# Patient Record
Sex: MALE | Race: BLACK OR AFRICAN AMERICAN | NOT HISPANIC OR LATINO | Employment: FULL TIME | URBAN - METROPOLITAN AREA
[De-identification: names, ages, dates, MRNs, and addresses within clinical notes are randomized per-mention and may not be internally consistent; named-entity substitution may affect disease eponyms.]

---

## 2017-06-09 ENCOUNTER — OFFICE VISIT (OUTPATIENT)
Dept: MEDICAL GROUP | Facility: MEDICAL CENTER | Age: 42
End: 2017-06-09
Attending: FAMILY MEDICINE
Payer: MEDICAID

## 2017-06-09 VITALS
WEIGHT: 238 LBS | TEMPERATURE: 98.1 F | HEIGHT: 73 IN | RESPIRATION RATE: 16 BRPM | BODY MASS INDEX: 31.54 KG/M2 | DIASTOLIC BLOOD PRESSURE: 80 MMHG | HEART RATE: 60 BPM | SYSTOLIC BLOOD PRESSURE: 118 MMHG | OXYGEN SATURATION: 100 %

## 2017-06-09 DIAGNOSIS — M25.562 LEFT LATERAL KNEE PAIN: ICD-10-CM

## 2017-06-09 DIAGNOSIS — E66.9 OBESITY (BMI 30-39.9): ICD-10-CM

## 2017-06-09 DIAGNOSIS — F12.90 MARIJUANA USE, CONTINUOUS: ICD-10-CM

## 2017-06-09 DIAGNOSIS — M54.50 BILATERAL LOW BACK PAIN WITHOUT SCIATICA, UNSPECIFIED CHRONICITY: ICD-10-CM

## 2017-06-09 PROCEDURE — 99204 OFFICE O/P NEW MOD 45 MIN: CPT | Performed by: FAMILY MEDICINE

## 2017-06-09 PROCEDURE — 99203 OFFICE O/P NEW LOW 30 MIN: CPT | Performed by: FAMILY MEDICINE

## 2017-06-09 RX ORDER — PREDNISONE 10 MG/1
TABLET ORAL
Qty: 30 TAB | Refills: 0 | Status: SHIPPED | OUTPATIENT
Start: 2017-06-09 | End: 2017-09-20

## 2017-06-09 RX ORDER — CYCLOBENZAPRINE HCL 10 MG
10 TABLET ORAL 3 TIMES DAILY PRN
Qty: 75 TAB | Refills: 0 | Status: SHIPPED | OUTPATIENT
Start: 2017-06-09 | End: 2017-09-20 | Stop reason: SDUPTHER

## 2017-06-09 ASSESSMENT — PATIENT HEALTH QUESTIONNAIRE - PHQ9: CLINICAL INTERPRETATION OF PHQ2 SCORE: 1

## 2017-06-09 ASSESSMENT — PAIN SCALES - GENERAL: PAINLEVEL: 10=SEVERE PAIN

## 2017-06-09 NOTE — PROGRESS NOTES
Chief Complaint   Patient presents with   • Establish Care   • Back Pain   • Knee Pain         HISTORY OF THE PRESENT ILLNESS: Patient is a 41 y.o. male. This pleasant patient is here today to establish care, and be evaluated for left knee pain, low back pain      Left lateral knee pain  Patient notes on and off pains over the years with his left knee. However he reports the past 2 months persistent pain on the lateral aspect of his lower left knee. There is no associated buckling or locking. Reports at times there is a swelling and not will form below the lateral joint line. Patient seems to experience more pain after he's been more active in the sits or lies down. Has not had any prior procedures or imaging    Marijuana use, continuous  Patient reports with increased back and knee pain over the past 2 months he has taken to smoking 7-8 blunts (larger joints) each day. He does not smoke any tobacco. He does not use any illicit substances. He recalls drinking heavily in the past but has markedly restricted his alcohol intake in the past 2 months    Bilateral low back pain without sciatica  Patient notes past short-lived episodes of low back pain. He reports persistent pain over the past month in his lower lumbar bilateral area. Pain does not radiate to his legs. There is no associated urinary or fecal incontinence. Reports gait is normal. Reports back stiffens up making it difficult to get out of bed or up out of a chair area and he has been taking thousand milligrams of ibuprofen 5 or 6 times per day. Denies any epigastric pain or black stools. Notes mild benefit with that amount of ibuprofen.     Social history-single, not working  Allergies: Review of patient's allergies indicates no known allergies.    Current Outpatient Prescriptions Ordered in [facility]   Medication Sig Dispense Refill   • predniSONE (DELTASONE) 10 MG Tab 5 by mouth daily ×2 days, 4 by mouth daily ×2 days, 3 by mouth daily ×2 days, 2 by mouth daily  "×2 days, 1 by mouth daily ×2 days 30 Tab 0   • cyclobenzaprine (FLEXERIL) 10 MG Tab Take 1 Tab by mouth 3 times a day as needed. 75 Tab 0     No current Epic-ordered facility-administered medications on file.       History reviewed. No pertinent past medical history.    History reviewed. No pertinent past surgical history.    Social History   Substance Use Topics   • Smoking status: Never Smoker    • Smokeless tobacco: Never Used   • Alcohol Use: Yes      Comment: sometimes, reduced  since Mar 2017            Family History   Problem Relation Age of Onset   • Cancer Mother    • Diabetes Mother    • Cancer Sister      breast   • Heart Disease Neg Hx    • Stroke Neg Hx        Review of Systems   Constitutional: Negative for fever, chills, weight loss and malaise/fatigue.   HENT: Negative for ear pain, nosebleeds, congestion, sore throat and neck pain.    Eyes: Negative for blurred vision.   Respiratory: Negative for cough, sputum production, shortness of breath and wheezing.    Cardiovascular: Negative for chest pain, palpitations, orthopnea and leg swelling.   Gastrointestinal: Negative for heartburn, nausea, vomiting and abdominal pain.   Genitourinary: Negative for dysuria, urgency and frequency.   Musculoskeletal: Positive for low back pain, lateral left knee pain  Skin: Negative for rash and itching.   Neurological: Negative for dizziness, tingling, tremors, sensory change, focal weakness and headaches.   Endo/Heme/Allergies: Does not bruise/bleed easily.   Psychiatric/Behavioral: Negative for depression, anxiety, or memory loss.            Exam: Blood pressure 118/80, pulse 60, temperature 36.7 °C (98.1 °F), resp. rate 16, height 1.854 m (6' 1\"), weight 107.956 kg (238 lb), SpO2 100 %.  General: Normal appearing. No distress.  HEENT: Normocephalic. Eyes conjunctiva clear lids without ptosis, pupils equal and reactive to light accommodation, ears normal shape and contour, canals are clear bilaterally, tympanic " membranes are benign, nasal mucosa benign, oropharynx is without erythema, edema or exudates.   Neck: Supple without JVD or bruit. Thyroid is not enlarged.  Pulmonary: Clear to ausculation.  Normal effort. No rales, ronchi, or wheezing.  Cardiovascular: Regular rate and rhythm without murmur. Carotid and radial pulses are intact and equal bilaterally.  Abdomen: Soft, nontender, nondistended. Normal bowel sounds. Liver and spleen are not palpable  Neurologic: Intact light touch and strength bilaterally,normal speech, no tremor, normal gait.   Lymph: No cervical, supraclavicular or axillary lymph nodes are palpable  Skin: Warm and dry.  No obvious lesions.  Musculoskeletal: Normal gait. No extremity cyanosis, clubbing, or edema. Left knee notable for slight swelling/thickening with no firm induration or fluctuance near the head of the fibula on the left side. No overlying redness or swelling. Anterior drawer is negative. No mediolateral instability. Patella is nontender and shows normal mobility  Psych: Normal mood and affect. Alert and oriented x3. Judgment and insight is normal.  Back-1+ tender midline from L3-S2 and the paraspinous areas bilaterally at that same level    Please note that this dictation was created using voice recognition software. I have made every reasonable attempt to correct obvious errors, but I expect that there are errors of grammar and possibly content that I did not discover before finalizing the note.      Assessment/Plan  1. Obesity (BMI 30-39.9)     2. Left lateral knee pain  DX-KNEE COMPLETE 4+ LEFT   3. Bilateral low back pain without sciatica, unspecified chronicity  DX-LUMBAR SPINE-4+ VIEWS   4. Marijuana use, continuous       Plan: 1. Lumbar spine x-ray, left knee x-ray  2. Prednisone 50 mg taper ×10 days-old ibuprofen  3. Patient cautioned to not exceeding 's recommendations for ibuprofen in the future  4. Flexeril 10 mg up to 3 times daily for low back pain  5.  Intermittent heat to low back  6. Revisit in 3 weeks

## 2017-06-09 NOTE — ASSESSMENT & PLAN NOTE
Patient notes past short-lived episodes of low back pain. He reports persistent pain over the past month in his lower lumbar bilateral area. Pain does not radiate to his legs. There is no associated urinary or fecal incontinence. Reports gait is normal. Reports back stiffens up making it difficult to get out of bed or up out of a chair area and he has been taking thousand milligrams of ibuprofen 5 or 6 times per day. Denies any epigastric pain or black stools. Notes mild benefit with that amount of ibuprofen.

## 2017-06-09 NOTE — ASSESSMENT & PLAN NOTE
Patient reports with increased back and knee pain over the past 2 months he has taken to smoking 7-8 blunts (larger joints) each day. He does not smoke any tobacco. He does not use any illicit substances. He recalls drinking heavily in the past but has markedly restricted his alcohol intake in the past 2 months

## 2017-06-09 NOTE — MR AVS SNAPSHOT
"        David Moncada   2017 8:50 AM   Office Visit   MRN: [id number]    Department:  [facility]   Dept Phone:  [phone]    Description:  Male : 1975   Provider:  Dexter Coates M.D.           Reason for Visit     Establish Care     Back Pain     Knee Pain           Allergies as of 2017     No Known Allergies      You were diagnosed with     Obesity (BMI 30-39.9)   [787071]       Left lateral knee pain   [799733]       Bilateral low back pain without sciatica, unspecified chronicity   [7068993]         Vital Signs     Blood Pressure Pulse Temperature Respirations Height Weight    118/80 mmHg 60 36.7 °C (98.1 °F) 16 1.854 m (6' 1\") 107.956 kg (238 lb)    Body Mass Index Oxygen Saturation Smoking Status             31.41 kg/m2 100% Never Smoker          Basic Information     Date Of Birth Sex Race Ethnicity Preferred Language    1975 Male Unable to Obtain Unknown English      Your appointments     2017  8:30 AM   Established Patient with Dexter Coates M.D.   [facility] ([facility])    [address] [place] [id number]   [phone]           You will be receiving a confirmation call a few days before your appointment from our automated call confirmation system.              Health Maintenance     Patient has no pending health maintenance at this time      Current Immunizations     No immunizations on file.      Below and/or attached are the medications your provider expects you to take. Review all of your home medications and newly ordered medications with your provider and/or pharmacist. Follow medication instructions as directed by your provider and/or pharmacist. Please keep your medication list with you and share with your provider. Update the information when medications are discontinued, doses are changed, or new medications (including over-the-counter products) are added; and carry medication information at all times in the event of emergency " situations     Allergies:  No Known Allergies          Medications  Valid as of: June 09, 2017 - 10:04 AM    Generic Name Brand Name Tablet Size Instructions for use    Cyclobenzaprine HCl (Tab) FLEXERIL 10 MG Take 1 Tab by mouth 3 times a day as needed.        PredniSONE (Tab) DELTASONE 10 MG 5 by mouth daily ×2 days, 4 by mouth daily ×2 days, 3 by mouth daily ×2 days, 2 by mouth daily ×2 days, 1 by mouth daily ×2 days        .                 Medicines prescribed today were sent to:     [facility] [address] [place] [address], [place] - [address] [place] [address]    Phone: [phone] Fax: [phone]    Open 24 Hours?: No      Medication refill instructions:       If your prescription bottle indicates you have medication refills left, it is not necessary to call your provider’s office. Please contact your pharmacy and they will refill your medication.    If your prescription bottle indicates you do not have any refills left, you may request refills at any time through one of the following ways: The online [employer] system (except Urgent Care), by calling your provider’s office, or by asking your pharmacy to contact your provider’s office with a refill request. Medication refills are processed only during regular business hours and may not be available until the next business day. Your provider may request additional information or to have a follow-up visit with you prior to refilling your medication.   *Please Note: Medication refills are assigned a new Rx number when refilled electronically. Your pharmacy may indicate that no refills were authorized even though a new prescription for the same medication is available at the pharmacy. Please request the medicine by name with the pharmacy before contacting your provider for a refill.        Your To Do List     Future Labs/Procedures Complete By Expires    DX-KNEE COMPLETE 4+ LEFT  As directed 6/9/2018    DX-LUMBAR SPINE-4+ VIEWS  As directed  6/9/2018         [employer] Access Code: [email or web address]  Expires: 7/9/2017 10:04 AM    [employer]  A secure, online tool to manage your health information     [employer]’s [employer]® is a secure, online tool that connects you to your personalized health information from the privacy of your home -- day or night - making it very easy for you to manage your healthcare. Once the activation process is completed, you can even access your medical information using the [employer] lauren, which is available for free in the Apple Lauren store or Google Play store.     [employer] provides the following levels of access (as shown below):   My Chart Features   [facility] Primary Care Doctor [facility]  Specialists [facility]  Urgent  Care Non-[facility]  Primary Care  Doctor   Email your healthcare team securely and privately 24/7 X X X    Manage appointments: schedule your next appointment; view details of past/upcoming appointments X      Request prescription refills. X      View recent personal medical records, including lab and immunizations X X X X   View health record, including health history, allergies, medications X X X X   Read reports about your outpatient visits, procedures, consult and ER notes X X X X   See your discharge summary, which is a recap of your hospital and/or ER visit that includes your diagnosis, lab results, and care plan. X X       How to register for [employer]:  1. Go to  [email or web address].  2. Click on the Sign Up Now box, which takes you to the New Member Sign Up page. You will need to provide the following information:  a. Enter your [employer] Access Code exactly as it appears at the top of this page. (You will not need to use this code after you’ve completed the sign-up process. If you do not sign up before the expiration date, you must request a new code.)   b. Enter your date of birth.   c. Enter your home email address.   d. Click Submit, and follow the next screen’s instructions.  3. Create a [employer] ID. This will  be your [employer] login ID and cannot be changed, so think of one that is secure and easy to remember.  4. Create a [employer] password. You can change your password at any time.  5. Enter your Password Reset Question and Answer. This can be used at a later time if you forget your password.   6. Enter your e-mail address. This allows you to receive e-mail notifications when new information is available in [employer].  7. Click Sign Up. You can now view your health information.    For assistance activating your [employer] account, call [phone]

## 2017-09-20 ENCOUNTER — OFFICE VISIT (OUTPATIENT)
Dept: MEDICAL GROUP | Facility: MEDICAL CENTER | Age: 42
End: 2017-09-20
Attending: FAMILY MEDICINE
Payer: MEDICAID

## 2017-09-20 VITALS
WEIGHT: 240 LBS | RESPIRATION RATE: 20 BRPM | TEMPERATURE: 98.8 F | SYSTOLIC BLOOD PRESSURE: 110 MMHG | HEART RATE: 64 BPM | OXYGEN SATURATION: 98 % | BODY MASS INDEX: 31.81 KG/M2 | HEIGHT: 73 IN | DIASTOLIC BLOOD PRESSURE: 78 MMHG

## 2017-09-20 DIAGNOSIS — M54.50 BILATERAL LOW BACK PAIN WITHOUT SCIATICA, UNSPECIFIED CHRONICITY: ICD-10-CM

## 2017-09-20 PROCEDURE — 99214 OFFICE O/P EST MOD 30 MIN: CPT | Performed by: FAMILY MEDICINE

## 2017-09-20 RX ORDER — CYCLOBENZAPRINE HCL 10 MG
10 TABLET ORAL 3 TIMES DAILY PRN
Qty: 75 TAB | Refills: 0 | Status: SHIPPED | OUTPATIENT
Start: 2017-09-20

## 2017-09-20 RX ORDER — IBUPROFEN 400 MG/1
400 TABLET ORAL EVERY 6 HOURS PRN
Qty: 30 TAB | Refills: 3 | Status: SHIPPED | OUTPATIENT
Start: 2017-09-20

## 2017-09-20 RX ORDER — GABAPENTIN 100 MG/1
100 CAPSULE ORAL 3 TIMES DAILY
Qty: 90 CAP | Refills: 3 | Status: SHIPPED | OUTPATIENT
Start: 2017-09-20

## 2017-09-20 ASSESSMENT — ENCOUNTER SYMPTOMS
NECK PAIN: 0
HEADACHES: 0
VOMITING: 0
SENSORY CHANGE: 0
COUGH: 0
SPEECH CHANGE: 0
ABDOMINAL PAIN: 0
FEVER: 0
TREMORS: 0
BACK PAIN: 1
TINGLING: 1
HEMOPTYSIS: 0
CHILLS: 0
NAUSEA: 0
FOCAL WEAKNESS: 0
PALPITATIONS: 0

## 2017-09-21 NOTE — PROGRESS NOTES
"Subjective:      David Moncada is a 42 y.o. male who presents with Flank Pain            Patient here for follow-up of his lower back pain. He had been initially seen by his primary care for the same problem and had been started on muscle relaxers and steroids. He states that the medications did not really seem to help a lot and he needs something stronger. He has not yet gotten the x-rays done of his lower back as ordered, so we will have patient get his x-rays done prior to following up with his primary care provider.  We'll have him start Neurontin at 100 mg 3 times a day and also Motrin as needed. Also a urinalysis to make sure he is not having any changes in his urine including changes or infection or kidney stones.  He has been advised has any sudden worsening of his symptoms, fevers, rashes, incontinence or other neurologic changes he should go to the emergency room for further assistance in management.     Current medications, allergies, and problem list reviewed with patient and updated in EPIC.          Review of Systems   Constitutional: Negative for chills and fever.   HENT: Negative for hearing loss.    Respiratory: Negative for cough and hemoptysis.    Cardiovascular: Negative for chest pain and palpitations.   Gastrointestinal: Negative for abdominal pain, nausea and vomiting.   Musculoskeletal: Positive for back pain. Negative for joint pain and neck pain.   Skin: Negative for rash.   Neurological: Positive for tingling. Negative for tremors, sensory change, speech change, focal weakness and headaches.          Objective:     /78   Pulse 64   Temp 37.1 °C (98.8 °F)   Resp 20   Ht 1.854 m (6' 0.99\")   Wt 108.9 kg (240 lb)   SpO2 98%   BMI 31.67 kg/m²      Physical Exam   Constitutional: He is oriented to person, place, and time. He appears well-developed and well-nourished.   HENT:   Head: Normocephalic and atraumatic.   Neck: Normal range of motion. No thyromegaly present. "   Cardiovascular: Normal rate and regular rhythm.  Exam reveals no friction rub.    No murmur heard.  Pulmonary/Chest: Effort normal and breath sounds normal. No respiratory distress. He has no wheezes.   Abdominal: Soft. Bowel sounds are normal. He exhibits no distension. There is no tenderness.   Musculoskeletal:   Decreased ROM of back in flexion, pain in lower back with downward  pressure placed on head   Lymphadenopathy:     He has no cervical adenopathy.   Neurological: He is alert and oriented to person, place, and time.   Nursing note and vitals reviewed.              Assessment/Plan:     1. BMI 31.0-31.9,adult  Discussed diet and exercise to lower weight which may help reduce back pain.  - Patient identified as having weight management issue.  Appropriate orders and counseling given.    2. Bilateral low back pain without sciatica, unspecified chronicity  We'll have him continue his muscle relaxer as previously prescribed, will also add Neurontin at 100 mg 3 times a day and Motrin as needed. We'll also have him get the x-ray the had been previously ordered. We'll continue to follow. ER precautions have been given if he should develop any concerning symptoms.

## 2017-09-28 ENCOUNTER — HOSPITAL ENCOUNTER (OUTPATIENT)
Dept: RADIOLOGY | Facility: MEDICAL CENTER | Age: 42
End: 2017-09-28
Attending: FAMILY MEDICINE
Payer: MEDICAID

## 2017-09-28 DIAGNOSIS — M25.562 LEFT LATERAL KNEE PAIN: ICD-10-CM

## 2017-09-28 DIAGNOSIS — M54.50 BILATERAL LOW BACK PAIN WITHOUT SCIATICA, UNSPECIFIED CHRONICITY: ICD-10-CM

## 2017-09-28 PROCEDURE — 72110 X-RAY EXAM L-2 SPINE 4/>VWS: CPT

## 2017-09-28 PROCEDURE — 73564 X-RAY EXAM KNEE 4 OR MORE: CPT | Mod: LT

## 2017-10-03 ENCOUNTER — OFFICE VISIT (OUTPATIENT)
Dept: MEDICAL GROUP | Facility: MEDICAL CENTER | Age: 42
End: 2017-10-03
Attending: FAMILY MEDICINE
Payer: MEDICAID

## 2017-10-03 VITALS
BODY MASS INDEX: 31.14 KG/M2 | RESPIRATION RATE: 16 BRPM | WEIGHT: 235 LBS | OXYGEN SATURATION: 98 % | DIASTOLIC BLOOD PRESSURE: 76 MMHG | HEART RATE: 96 BPM | TEMPERATURE: 97.2 F | HEIGHT: 73 IN | SYSTOLIC BLOOD PRESSURE: 118 MMHG

## 2017-10-03 DIAGNOSIS — M54.41 ACUTE BILATERAL LOW BACK PAIN WITH RIGHT-SIDED SCIATICA: ICD-10-CM

## 2017-10-03 PROCEDURE — 99213 OFFICE O/P EST LOW 20 MIN: CPT | Performed by: FAMILY MEDICINE

## 2017-10-03 RX ORDER — PREDNISONE 10 MG/1
TABLET ORAL
Qty: 42 TAB | Refills: 0 | Status: SHIPPED | OUTPATIENT
Start: 2017-10-03

## 2017-10-03 ASSESSMENT — PAIN SCALES - GENERAL: PAINLEVEL: 10=SEVERE PAIN

## 2017-10-03 NOTE — PROGRESS NOTES
"Subjective:      David Moncada is a 42 y.o. male who presents with Back Pain            HPI 1. Bilateral low back pain with right-sided sciatica-patient reports onset 3 months ago of moderate level of the bilateral low back ache on a daily basis. He reports after a cough about one month ago he's had sustained increased levels of pain across his lumbar area bilaterally. Also reports tingling that will start with in 1-2 minutes of standing and walking that will radiate down to his right foot. The tingling will amply subside within 3 or 4 minutes if he sits down. The back pain does not improve with sitting or lying down. Patient reports sleep is fragmented due to pain.    ROS negative for urinary incontinence, fecal incontinence. Positive for altered gait as he favors his back.       Objective:     /76   Pulse 96   Temp 36.2 °C (97.2 °F)   Resp 16   Ht 1.854 m (6' 0.99\")   Wt 106.6 kg (235 lb)   SpO2 98%   BMI 31.01 kg/m²      Physical Exam   Gen.-alert cooperative male in mild distress  Chest- clear breath sounds without wheezes, rales, ronchi. No retractions. Chest wall nontender.    Back-1+ tender in the midline from L4-S3. 1+ tender bilateral para Lumbar regions  Lower extremities-intact light touch bilaterally. Intact strength bilaterally. Pain with straight leg raising to 90° both right and left leg. 1+ patellar reflexes bilaterally. Ankle jerks 0/0. Both toes are downgoing.  Lumbar spine x-rays-L5-S1 disc space narrowing along with facet arthropathy     Assessment/Plan:     1. Acute bilateral low back pain with right-sided sciatica    Plan: 1. PT referral  2. MRI of the lumbar spine to evaluate disc space narrowing at L5-S1 seen on plain x-rays  3. Prednisone 60 mg taper  "

## 2017-10-10 ENCOUNTER — OFFICE VISIT (OUTPATIENT)
Dept: MEDICAL GROUP | Facility: MEDICAL CENTER | Age: 42
End: 2017-10-10
Attending: FAMILY MEDICINE
Payer: MEDICAID

## 2017-10-10 VITALS
BODY MASS INDEX: 31.28 KG/M2 | OXYGEN SATURATION: 96 % | HEIGHT: 73 IN | HEART RATE: 100 BPM | RESPIRATION RATE: 16 BRPM | TEMPERATURE: 97 F | WEIGHT: 236 LBS | SYSTOLIC BLOOD PRESSURE: 126 MMHG | DIASTOLIC BLOOD PRESSURE: 82 MMHG

## 2017-10-10 DIAGNOSIS — M54.40 BACK PAIN OF LUMBAR REGION WITH SCIATICA: ICD-10-CM

## 2017-10-10 PROCEDURE — 99213 OFFICE O/P EST LOW 20 MIN: CPT | Performed by: FAMILY MEDICINE

## 2017-10-10 PROCEDURE — 99212 OFFICE O/P EST SF 10 MIN: CPT | Performed by: FAMILY MEDICINE

## 2017-10-10 RX ORDER — HYDROCODONE BITARTRATE AND ACETAMINOPHEN 10; 325 MG/1; MG/1
1 TABLET ORAL EVERY 8 HOURS PRN
Qty: 30 TAB | Refills: 0 | Status: SHIPPED | OUTPATIENT
Start: 2017-10-10

## 2017-10-10 ASSESSMENT — PAIN SCALES - GENERAL: PAINLEVEL: 10=SEVERE PAIN

## 2017-10-10 NOTE — PROGRESS NOTES
"Subjective:      David Moncada is a 42 y.o. male who presents with Back Pain            HPI 1. Right-sided low back pain with sciatica-patient reports since her last visit he got no benefit from taking the 60 mg prednisone taper. Reports that the intensity of his low back pain has dramatically worsened in the past week. He also reports intermittent tingling on the soles of either foot. He has mild radiation of back pain up into his mid thoracic region. He reports radiation of pain from his right lower back down to his right knee with weightbearing.    ROS negative for fever, joint swelling, positive for altered gait       Objective:     /82   Pulse 100   Temp 36.1 °C (97 °F)   Resp 16   Ht 1.854 m (6' 0.99\")   Wt 107 kg (236 lb)   SpO2 96%   BMI 31.14 kg/m²      Physical Exam  Gen.-alert cooperative male in moderate distress  Back-focal 3 cm area of tenderness in the upper right parasacral area with an overlying mass redness or rash.  Lower extremities-intact light touch. Patellar and Achilles reflexes are difficult to elicit evenly bilaterally. Both toes are downgoing. Straight leg raising is positive for low back pain bilaterally. Strength is intact bilaterally.          Assessment/Plan:     1. Back pain of lumbar region with sciatica    Plan: 1. Patient has failed high-dose prednisone taper, ibuprofen, muscle relaxants  2. MRI was ordered last week but not yet scheduled we will call and get that scheduled as soon as possible  3. Short-term prescription of Norco 10 mg up to 3 times daily to assist with pain relief pending MRI    "

## 2017-10-13 ENCOUNTER — TELEPHONE (OUTPATIENT)
Dept: MEDICAL GROUP | Facility: MEDICAL CENTER | Age: 42
End: 2017-10-13

## 2017-10-14 NOTE — TELEPHONE ENCOUNTER
----- Message from Dexter Coates M.D. sent at 9/29/2017 11:15 AM PDT -----  X-rays show some narrowing on the medial aspect of the knee, disc space narrowing at L5-S1 with some arthritic change at that same spinal level.

## 2017-10-18 ENCOUNTER — HOSPITAL ENCOUNTER (OUTPATIENT)
Dept: RADIOLOGY | Facility: MEDICAL CENTER | Age: 42
End: 2017-10-18
Attending: FAMILY MEDICINE
Payer: MEDICAID

## 2017-10-18 DIAGNOSIS — M54.41 ACUTE BILATERAL LOW BACK PAIN WITH RIGHT-SIDED SCIATICA: ICD-10-CM

## 2017-10-18 PROCEDURE — 72148 MRI LUMBAR SPINE W/O DYE: CPT

## 2017-10-19 ENCOUNTER — APPOINTMENT (OUTPATIENT)
Dept: MEDICAL GROUP | Facility: MEDICAL CENTER | Age: 42
End: 2017-10-19
Attending: FAMILY MEDICINE
Payer: MEDICAID

## 2017-10-19 VITALS
TEMPERATURE: 97.9 F | HEART RATE: 104 BPM | SYSTOLIC BLOOD PRESSURE: 138 MMHG | BODY MASS INDEX: 31.68 KG/M2 | WEIGHT: 239 LBS | HEIGHT: 73 IN | OXYGEN SATURATION: 99 % | RESPIRATION RATE: 16 BRPM | DIASTOLIC BLOOD PRESSURE: 92 MMHG

## 2017-10-19 DIAGNOSIS — M51.27 HERNIATION OF INTERVERTEBRAL DISC BETWEEN L5 AND S1: ICD-10-CM

## 2017-10-19 DIAGNOSIS — M54.31 RIGHT SIDED SCIATICA: ICD-10-CM

## 2017-10-19 PROCEDURE — 99213 OFFICE O/P EST LOW 20 MIN: CPT | Performed by: FAMILY MEDICINE

## 2017-10-19 RX ORDER — OXYCODONE AND ACETAMINOPHEN 10; 325 MG/1; MG/1
1 TABLET ORAL EVERY 6 HOURS PRN
Qty: 28 TAB | Refills: 0 | Status: SHIPPED | OUTPATIENT
Start: 2017-10-19 | End: 2017-10-26 | Stop reason: SDUPTHER

## 2017-10-19 ASSESSMENT — PAIN SCALES - GENERAL: PAINLEVEL: 10=SEVERE PAIN

## 2017-10-19 NOTE — PROGRESS NOTES
"Subjective:      David Moncada is a 42 y.o. male who presents with Back Pain            HPI 1. L5-S1 disc herniation-patient reports previous fluctuating back pain became intense and consistent about one month ago. Recent trial of prednisone orally, 60 mg taper, was of no benefit. Patient reports recent Norco 3 times a day also did not really relieve his pain. He did complete his lumbar MRI which is notable for significant disc herniation at L5-S1 with marked narrowing of his bilateral foraminal recesses. Patient reports he essentially cannot even stand briefly without severe pain and walking is extremely compromised.    ROS negative for urinary incontinence, fecal incontinence, focal numbness.       Objective:     /92   Pulse (!) 104   Temp 36.6 °C (97.9 °F)   Resp 16   Ht 1.854 m (6' 1\")   Wt 108.4 kg (239 lb)   SpO2 99%   BMI 31.53 kg/m²      Physical Exam  Gen.-alert cooperative male in marked distress  Lower extremities-intact light touch bilaterally. Negative for ankle edema redness or warmth.   lumbar MRI was reviewed in detail with patient and his wife.       Assessment/Plan:     1. Herniation of intervertebral disc between L5 and S1      2. Right sided sciatica  Plan: 1. Urgent referral to [facility], back surgery  2. Rx Percocet 10/325 4 times a day, 1 week supply  "

## 2017-10-26 RX ORDER — OXYCODONE AND ACETAMINOPHEN 10; 325 MG/1; MG/1
1 TABLET ORAL EVERY 6 HOURS PRN
Qty: 28 TAB | Refills: 0 | Status: SHIPPED | OUTPATIENT
Start: 2017-10-26 | End: 2017-11-06 | Stop reason: SDUPTHER

## 2017-11-03 RX ORDER — HYDROCODONE BITARTRATE AND ACETAMINOPHEN 10; 325 MG/1; MG/1
1 TABLET ORAL EVERY 8 HOURS PRN
Qty: 30 TAB | Refills: 0 | OUTPATIENT
Start: 2017-11-03

## 2017-11-06 ENCOUNTER — TELEPHONE (OUTPATIENT)
Dept: MEDICAL GROUP | Facility: MEDICAL CENTER | Age: 42
End: 2017-11-06

## 2017-11-06 DIAGNOSIS — M25.562 LEFT LATERAL KNEE PAIN: ICD-10-CM

## 2017-11-06 RX ORDER — OXYCODONE AND ACETAMINOPHEN 10; 325 MG/1; MG/1
1 TABLET ORAL EVERY 6 HOURS PRN
Qty: 120 TAB | Refills: 0 | Status: SHIPPED | OUTPATIENT
Start: 2017-11-06

## 2017-11-06 NOTE — TELEPHONE ENCOUNTER
Patient reports he is attending his referred appointments at [facility]. Anticipate he will have arthroscopy. Due to a number of no-show no call events patient has been discharged from the [facility]. Rx today for Percocet to cover him over the next 30 days during which time I anticipate he will get surgical correction of his meniscal tears and he should be able to taper off completely his current opiates.

## 2017-11-06 NOTE — TELEPHONE ENCOUNTER
Pt informed of refusal due to need of appointment, also advised that he has been discharged from the clinic due to multiple no shows. He would like to speak with the manager to discuss reinstatement.

## 2017-12-06 ENCOUNTER — PHYSICAL THERAPY (OUTPATIENT)
Dept: PHYSICAL THERAPY | Facility: REHABILITATION | Age: 42
End: 2017-12-06
Attending: FAMILY MEDICINE
Payer: MEDICAID

## 2017-12-06 DIAGNOSIS — M53.86 LOW BACK DERANGEMENT SYNDROME: ICD-10-CM

## 2017-12-06 PROCEDURE — 97110 THERAPEUTIC EXERCISES: CPT

## 2017-12-06 PROCEDURE — 97161 PT EVAL LOW COMPLEX 20 MIN: CPT

## 2017-12-06 SDOH — ECONOMIC STABILITY: GENERAL: QUALITY OF LIFE: FAIR

## 2017-12-06 ASSESSMENT — ENCOUNTER SYMPTOMS
PAIN SCALE AT LOWEST: 10
QUALITY: ACHING
PAIN SCALE AT HIGHEST: 10
PAIN SCALE: 10
QUALITY: BURNING
QUALITY: TINGLING

## 2017-12-06 NOTE — OP THERAPY EVALUATION
Outpatient Physical Therapy  INITIAL EVALUATION    [facility] Physical Therapy [place] [address]  [address]  [place] [address]  Phone:  [phone]  Fax:  [phone]    Date of Evaluation: 12/06/2017    Patient: David Moncada  YOB: 1975  MRN: [id number]     Referring Provider: Dexter Coates M.D.  [address] [facility]  [address]  [place] [address]   Referring Diagnosis Lumbago with sciatica, right side [M54.41]     Time Calculation  Start time: 1017  Stop time: 1101 Time Calculation (min): 44 minutes         Chief Complaint: No chief complaint on file.    Visit Diagnoses     ICD-10-CM   1. Low back derangement syndrome M54.5         Subjective:   History of Present Illness:     Mechanism of injury:  Four months ago patient was standing up and coughed / immediate n/t into the R leg w/ pain progressing over the past 4 months w/ intermittent n/t L foot  Quality of life:  Fair  Prior level of function:  Unemployed construction work  Pain:     Current pain rating:  10    At best pain rating:  10    At worst pain rating:  10    Location:  Lbp and n/t down bakc of leg to foot    Quality:  Aching, tingling and burning    Pain Comments::  Aggs:   Standing --immediate pain   Donning socks/shoes  Sit> 2'  Sleep: up 4-5/ night  Diagnostic Tests:     MRI studies: abnormal      Diagnostic Tests Comments:  1.  Degenerative changes most notable at L5-S1 with moderate central disc protrusion with marked bilateral lateral recess stenosis compromising the emerging S1 nerve roots bilaterally. Overall moderate central spinal stenosis. Minor facet hypertrophy.   Borderline right foraminal stenosis. Mild left foraminal stenosis.  2.  L4-5 minor lateral disc bulging, left greater than right. Borderline right foraminal stenosis. Mild left foraminal stenosis. Mild facet arthropathy.  3.  L3-4 minor facet hypertrophy. Borderline bilateral foraminal stenosis.      No past medical history on file.  No past surgical history  "on file.  Social History   Substance Use Topics   • Smoking status: Never Smoker   • Smokeless tobacco: Never Used   • Alcohol use 0.0 oz/week      Comment: sometimes, reduced  since Mar 2017     Family and Occupational History     Social History   • Marital status: Single     Spouse name: N/A   • Number of children: N/A   • Years of education: N/A       Objective     Neurological Testing     Reflexes   Left   Patellar (L4): normal (2+)  Achilles (S1): absent (0)  Ankle clonus reflex: negative  Babinski sign: negative    Right   Patellar (L4): normal (2+)  Achilles (S1): absent (0)  Ankle clonus reflex: negative  Babinski sign: negative    Additional Neurological Details  MMT: bilaterally 4/5 l2-L5        Therapeutic Treatments and Modalities:     Therapeutic Treatment and Modalities Summary: There ex: x5 min.  Eval: x 20min.    E-stim: 15 min.    seil w/ R hip flexion//centralized symptoms from knee to R buttock--n/t no change in R but abolished L foot n/tinsturcted in functional lumbar anatomy  Lao  To l/s w/ seil w/ MHP x 15''//centralized symptoms to l/s  Issued Cruzito h/o    //\"less pain\"and able to stand 2' prio to increased leg pain      Assessment, Response and Plan:   Assessment details:  Patient presents with lumbar spine derangement syndrome--neuro wnl except bilaterally absent S1 dtrs, MMT: 4/5 throughout l2-s1.  Patient centralized w/ extension protocol but demonstrated high pain behavior, poor hip dissociation, poor body awareness.    Patient should respond well to treatment if compliant with POC  Goals:   Short Term Goals:   Standing > 5' w/o peripheralization   Donning socks/shoes w/o pain  Sit> 5' w/o increased pain upon standing  Sleep through night  RMQ    Plan:   Therapy options:  Physical therapy treatment to continue  Planned therapy interventions:  E Stim Unattended (CPT 68355), Therapeutic Activities (CPT 63794), Therapeutic Exercise (CPT 30780) and Mechanical Traction (CPT " 10976)  Frequency:  2x week  Duration in weeks:  8  Duration in visits:  16  Plan details:  Core stab, traction e-stim, Cruzito progression    Referring provider co-signature:  I have reviewed this plan of care and my co-signature certifies the need for services.    Physician Signature: ________________________________ Date: ______________

## 2017-12-19 ENCOUNTER — PHYSICAL THERAPY (OUTPATIENT)
Dept: PHYSICAL THERAPY | Facility: REHABILITATION | Age: 42
End: 2017-12-19
Attending: FAMILY MEDICINE
Payer: MEDICAID

## 2017-12-19 DIAGNOSIS — M53.86 LOW BACK DERANGEMENT SYNDROME: ICD-10-CM

## 2017-12-19 PROCEDURE — 97110 THERAPEUTIC EXERCISES: CPT

## 2017-12-19 PROCEDURE — 97012 MECHANICAL TRACTION THERAPY: CPT

## 2017-12-19 NOTE — OP THERAPY DAILY TREATMENT
Outpatient Physical Therapy  DAILY TREATMENT     [facility] Physical Therapy [address]  [address]  [place] [address]  Phone:  [phone]  Fax:  [phone]    Date: 12/19/2017    Patient: David Moncada  YOB: 1975  MRN: [id number]     Time Calculation  Start time: 0849  Stop time: 0928 Time Calculation (min): 39 minutes     Chief Complaint: No chief complaint on file.    Visit #: 2    SUBJECTIVE:  Some relief with extensions but does not last....worse as soon as I stand up    OBJECTIVE:  Noted antalgia and forward trunk lean with walk         Therapeutic Treatments and Modalities:     Therapeutic Treatment and Modalities Summary:    There ex.: x 25min.    traction: 15 min.     -- SEIL / REIL w/ R hip flexion//centralized symptoms to knee  --reviewed body mechanics(cont. Poor body awareness)  -- n/t no change in R but abolished L foot n/t instructed in functional lumbar anatomy  -- tx 90/50 x60/20 x 15 w/ mhp         Pain rating before treatment: 10--R buttock to R foot, L foot is tingling  Pain rating after treatment: 7    ASSESSMENT:   Slight centralization and slight decrease in pain w/ mckennzie protocol but little overall change.  Cont. High pain behavior limiting tolerance to activity.  Despite decreased pain, paresthesia remained unchanged.  Poor body awareness and body mechanics    PLAN/RECOMMENDATIONS:   Increase core stab as tolerated,   Traction ?

## 2017-12-22 ENCOUNTER — PHYSICAL THERAPY (OUTPATIENT)
Dept: PHYSICAL THERAPY | Facility: REHABILITATION | Age: 42
End: 2017-12-22
Attending: FAMILY MEDICINE
Payer: MEDICAID

## 2017-12-22 ENCOUNTER — TELEPHONE (OUTPATIENT)
Dept: PHYSICAL THERAPY | Facility: REHABILITATION | Age: 42
End: 2017-12-22

## 2017-12-22 DIAGNOSIS — M53.86 LOW BACK DERANGEMENT SYNDROME: ICD-10-CM

## 2017-12-22 PROCEDURE — 97012 MECHANICAL TRACTION THERAPY: CPT

## 2017-12-22 PROCEDURE — 97110 THERAPEUTIC EXERCISES: CPT

## 2017-12-22 PROCEDURE — 97014 ELECTRIC STIMULATION THERAPY: CPT

## 2017-12-22 NOTE — OP THERAPY DAILY TREATMENT
"  Outpatient Physical Therapy  DAILY TREATMENT     [facility] Physical Therapy [address]  [address]  [place] [address]  Phone:  [phone]  Fax:  [phone]    Date: 12/22/2017    Patient: David Moncada  YOB: 1975  MRN: [id number]     Time Calculation  Start time: 0317  Stop time: 0400 Time Calculation (min): 43 minutes     Chief Complaint: No chief complaint on file.    Visit #: 3    SUBJECTIVE:  \"No change w/ only temporary relief after last visit but worse as soon as I got to the car\"    OBJECTIVE:  Noted antalgia and forward trunk lean with walk         Therapeutic Treatments and Modalities:     Therapeutic Treatment and Modalities Summary:    There ex.: x 25min.    traction: 15 min.     -Core stab level II:  -ball in/out x 20  -ball bridge x1'\"//\"worse, everwhere\"  -attempted hamstring active stretch//\"too much pain\"         Traction 100/60x 60/20 x 15' mhp//      Pain rating before treatment: 10--\"all the to the top of the scale\"  Pain rating after treatment: 10    ASSESSMENT:   High pain behavior w/ limited tolerance  To exercise and no report of decreased pain with multiple types of treatment.    Unable to tolerate low level exercise and no significant relief with modalities    PLAN/RECOMMENDATIONS:   D/c due to plateau and patient unable to tolerate exercise progression    "

## 2017-12-22 NOTE — OP THERAPY DISCHARGE SUMMARY
Outpatient Physical Therapy  DISCHARGE SUMMARY NOTE      [facility] Physical Therapy [address]  [address]  [place] [address]  Phone:  [phone]  Fax:  [phone]        Patient Name:  David Moncada  :  1975  MR#:  [id number]    HICN:       Visits: 3        Cancel/No-Show:     Diagnosis/ICD-10: lbp m54.5    Referring Provider: Jamari Gonzalez        Your patient is being discharged from Physical therapy with the following comments:  Goals Not Met/ plateau      Comments:    High pain behavior w/ limited tolerance to  exercise and no report of decreased pain with multiple types of treatment.    Unable to tolerate low level exercise and no significant relief with modalities    PLAN/RECOMMENDATIONS:   D/c due to plateau with treatment      Jorge Leary, PT, DPT, OCS

## 2017-12-27 ENCOUNTER — APPOINTMENT (OUTPATIENT)
Dept: PHYSICAL THERAPY | Facility: REHABILITATION | Age: 42
End: 2017-12-27
Attending: FAMILY MEDICINE
Payer: MEDICAID

## 2017-12-29 ENCOUNTER — APPOINTMENT (OUTPATIENT)
Dept: PHYSICAL THERAPY | Facility: REHABILITATION | Age: 42
End: 2017-12-29
Attending: FAMILY MEDICINE
Payer: MEDICAID

## 2019-11-22 ENCOUNTER — NON-PROVIDER VISIT (OUTPATIENT)
Dept: URGENT CARE | Facility: PHYSICIAN GROUP | Age: 44
End: 2019-11-22

## 2019-11-22 DIAGNOSIS — Z02.1 PRE-EMPLOYMENT DRUG SCREENING: Primary | ICD-10-CM

## 2019-11-22 LAB
AMP AMPHETAMINE: NORMAL
COC COCAINE: NORMAL
INT CON NEG: NORMAL
INT CON POS: NORMAL
MET METHAMPHETAMINES: NORMAL
OPI OPIATES: NORMAL
PCP PHENCYCLIDINE: NORMAL
POC DRUG COMMENT 753798-OCCUPATIONAL HEALTH: NEGATIVE
THC: NORMAL

## 2019-11-22 PROCEDURE — 80305 DRUG TEST PRSMV DIR OPT OBS: CPT | Performed by: PHYSICIAN ASSISTANT

## 2021-01-25 ENCOUNTER — HOSPITAL ENCOUNTER (EMERGENCY)
Dept: HOSPITAL 8 - ED | Age: 46
Discharge: HOME | End: 2021-01-25
Payer: MEDICAID

## 2021-01-25 VITALS — BODY MASS INDEX: 33.92 KG/M2 | HEIGHT: 72 IN | WEIGHT: 250.45 LBS

## 2021-01-25 VITALS — DIASTOLIC BLOOD PRESSURE: 100 MMHG | SYSTOLIC BLOOD PRESSURE: 146 MMHG

## 2021-01-25 DIAGNOSIS — H10.232: Primary | ICD-10-CM
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
## 2021-01-25 DIAGNOSIS — R51.9: ICD-10-CM
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
## 2021-01-25 PROCEDURE — 70450 CT HEAD/BRAIN W/O DYE: CPT

## 2021-01-25 PROCEDURE — 99284 EMERGENCY DEPT VISIT MOD MDM: CPT

## 2021-01-25 NOTE — NUR
patient resting up in eye exam chair. L janay-orbital swelling present. 
non-tender to palpation. R lateral eye brow scab from recent fall "9 days ago". 
patient denies visual changes, dizziness, lightheadedness. pupils both reactive 
and 4mm and round. patient has family member at chair side. patient asks "am i 
going to die from this? " RN reassured patient

## 2021-01-25 NOTE — NUR
discharge instructions reviewed with patient. questions regarding holding his 
child. these were answered to the best of my knowledge. in NAD. steady gait on 
ambulation to registration desk. all personal belongings with patient